# Patient Record
Sex: MALE | Race: WHITE | NOT HISPANIC OR LATINO | Employment: OTHER | URBAN - METROPOLITAN AREA
[De-identification: names, ages, dates, MRNs, and addresses within clinical notes are randomized per-mention and may not be internally consistent; named-entity substitution may affect disease eponyms.]

---

## 2021-01-15 DIAGNOSIS — Z23 NEED FOR VACCINATION: ICD-10-CM

## 2021-02-17 ENCOUNTER — IMMUNIZATION (OUTPATIENT)
Dept: FAMILY PLANNING/WOMEN'S HEALTH CLINIC | Facility: IMMUNIZATION CENTER | Age: 71
End: 2021-02-17
Attending: INTERNAL MEDICINE
Payer: MEDICARE

## 2021-02-17 DIAGNOSIS — Z23 NEED FOR VACCINATION: ICD-10-CM

## 2021-02-17 DIAGNOSIS — Z23 ENCOUNTER FOR VACCINATION: Primary | ICD-10-CM

## 2021-02-17 PROCEDURE — 91300 PFIZER SARS-COV-2 VACCINE: CPT

## 2021-02-17 PROCEDURE — 0001A PFIZER SARS-COV-2 VACCINE: CPT

## 2021-03-06 ENCOUNTER — IMMUNIZATION (OUTPATIENT)
Dept: FAMILY PLANNING/WOMEN'S HEALTH CLINIC | Facility: IMMUNIZATION CENTER | Age: 71
End: 2021-03-06
Attending: INTERNAL MEDICINE
Payer: MEDICARE

## 2021-03-06 DIAGNOSIS — Z23 ENCOUNTER FOR VACCINATION: Primary | ICD-10-CM

## 2021-03-06 PROCEDURE — 91300 PFIZER SARS-COV-2 VACCINE: CPT

## 2021-03-06 PROCEDURE — 0002A PFIZER SARS-COV-2 VACCINE: CPT

## 2021-11-15 PROBLEM — M25.521 RIGHT ELBOW PAIN: Status: ACTIVE | Noted: 2021-11-15

## 2021-11-15 PROBLEM — R22.33: Status: ACTIVE | Noted: 2021-11-15

## 2022-06-15 ENCOUNTER — HOSPITAL ENCOUNTER (EMERGENCY)
Facility: MEDICAL CENTER | Age: 72
End: 2022-06-15
Attending: EMERGENCY MEDICINE
Payer: MEDICARE

## 2022-06-15 VITALS
BODY MASS INDEX: 34.54 KG/M2 | HEART RATE: 69 BPM | SYSTOLIC BLOOD PRESSURE: 142 MMHG | RESPIRATION RATE: 18 BRPM | TEMPERATURE: 96.9 F | WEIGHT: 246.69 LBS | DIASTOLIC BLOOD PRESSURE: 76 MMHG | OXYGEN SATURATION: 99 % | HEIGHT: 71 IN

## 2022-06-15 DIAGNOSIS — R19.7 DIARRHEA, UNSPECIFIED TYPE: ICD-10-CM

## 2022-06-15 DIAGNOSIS — S20.211A CHEST WALL CONTUSION, RIGHT, INITIAL ENCOUNTER: ICD-10-CM
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
## 2022-06-15 PROCEDURE — 99282 EMERGENCY DEPT VISIT SF MDM: CPT

## 2022-06-15 RX ORDER — TRAMADOL HYDROCHLORIDE 50 MG/1
50 TABLET ORAL EVERY 6 HOURS PRN
Qty: 20 TABLET | Refills: 0 | Status: SHIPPED | OUTPATIENT
Start: 2022-06-15 | End: 2022-06-22

## 2022-06-15 RX ORDER — LISINOPRIL 5 MG/1
5 TABLET ORAL DAILY
COMMUNITY

## 2022-06-15 NOTE — ED TRIAGE NOTES
"Chief Complaint   Patient presents with   • GLF     Pt reports GLF last night while trying to put traffic cones away at the [employer]. Pt reports he stepped wrong and fell forward onto right side. Pt reports right side pain over right rib cage. No trauma or bruising noted over the area. Pt denies LOC, denies blood thinners, denies head strike.   • Diarrhea     Starting tonight after fall.        71 yo male to triage for above complaint. Pt able to ambulate to triage room and back to lobby with steady gait. GCS=15.     Pt is alert and oriented, speaking in full sentences, follows commands and responds appropriately to questions. NAD. Resp are even and unlabored.      Pt placed in lobby. Pt educated on triage process. Pt encouraged to alert staff for any changes.     Patient and staff wearing appropriate PPE    BP (!) 184/103 Comment: HX  Pulse 91   Temp 36 °C (96.8 °F) (Temporal)   Resp 18   Ht 1.803 m (5' 11\")   Wt 112 kg (246 lb 11.1 oz)   SpO2 97% Comment: Room air  BMI 34.41 kg/m²     "

## 2022-06-15 NOTE — DISCHARGE INSTRUCTIONS
Based on examination this is likely chest contusion and not fracture but both are treated the same.  Use Ultram as needed with Tylenol for pain control and do not wrap the chest as this may lead to pneumonia  Do not operate a vehicle or heavy machinery on Ultram  If you have continued diarrhea more than 4 to 5 days please follow-up with your primary care provider

## 2022-06-15 NOTE — ED PROVIDER NOTES
ED Provider Note    Scribed for Mynor Goncalves M.D. by Sharon Watson. 6/15/2022  7:23 AM    Primary care provider: Pcp Pt States None  Means of arrival: Walk-in  History obtained from: Patient  History limited by: None    CHIEF COMPLAINT  Chief Complaint   Patient presents with   • GLF     Pt reports GLF last night while trying to put traffic cones away at the [employer] [place]. Pt reports he stepped wrong and fell forward onto right side. Pt reports right side pain over right rib cage. No trauma or bruising noted over the area. Pt denies LOC, denies blood thinners, denies head strike.   • Diarrhea     Starting tonight after fall.        HPI  Danny Welch is a 72 y.o. male who presents to the Emergency Department for right-sided rib pain acute onset last night at 7:00 PM after sustaining a ground level fall. He states he was moving traffic cones at the [employer] when he stepped on the base and tripped. He denies loss of consciousness. He rates his rib pain an 8-9/10 in severity. The patient denies taking any medications to alleviate pain.   He additionally reports one episode of diarrhea while sleepign in his bed last night. He denies back pain, loss of sensation in the groin or genitals, and arm pain.     REVIEW OF SYSTEMS  Pertinent negatives include no back pain. As above, all other systems reviewed and are negative.   See HPI for further details.     PAST MEDICAL HISTORY   has a past medical history of Hypertension.    SURGICAL HISTORY  patient denies any surgical history    SOCIAL HISTORY  Social History     Tobacco Use   • Smoking status: Never Smoker   • Smokeless tobacco: Never Used   Substance Use Topics   • Alcohol use: Yes     Comment: occ   • Drug use: Never      Social History     Substance and Sexual Activity   Drug Use Never       FAMILY HISTORY  History reviewed. No pertinent family history.    CURRENT MEDICATIONS  Home Medications     Reviewed by Bindu Dixon R.N. (Registered Nurse) on 06/15/22 at  "0500  Med List Status: Partial   Medication Last Dose Status   lisinopril (PRINIVIL) 5 MG Tab  Active                ALLERGIES  No Known Allergies    PHYSICAL EXAM  VITAL SIGNS: /89   Pulse 69   Temp 36.1 °C (96.9 °F) (Temporal)   Resp 18   Ht 1.803 m (5' 11\")   Wt 112 kg (246 lb 11.1 oz)   SpO2 99% Comment: Roomair  BMI 34.41 kg/m²   Constitutional: Well developed, Well nourished, No acute distress, Non-toxic appearance.   HENT: Normocephalic, Atraumatic, Bilateral external ears normal, Oropharynx is clear mucous membranes are moist. No oral exudates or nasal discharge.   Eyes: Pupils are equal round and reactive, EOMI, Conjunctiva normal, No discharge.   Neck: Normal range of motion, No tenderness, Supple, No stridor. No meningismus.  Lymphatic: No lymphadenopathy noted.   Cardiovascular: Regular rate and rhythm without murmur rub or gallop.  Thorax & Lungs: Clear breath sounds bilaterally without wheezes, rhonchi or rales. There is no chest wall tenderness.   Abdomen: Soft non-tender non-distended. There is no rebound or guarding. No organomegaly is appreciated. Bowel sounds are hyperactive.  Skin: Normal without rash.   Back: No CVA or spinal tenderness. No pain or tenderness.  Extremities: Intact distal pulses, No edema, No tenderness, No cyanosis, No clubbing. Capillary refill is less than 2 seconds.  Musculoskeletal: Good range of motion in all major joints. No tenderness to palpation or major deformities noted.   Neurologic: Alert & oriented x 3, Normal motor function, Normal sensory function, No focal deficits noted. Reflexes are normal.  Psychiatric: Affect normal, Judgment normal, Mood normal. There is no suicidal ideation or patient reported hallucinations.     COURSE & MEDICAL DECISION MAKING  Nursing notes, VS, PMSFHx reviewed in chart.    7:23 AM - Patient seen and examined at bedside. I informed the patient he likely has a rib contusion. I informed him about the plan for discharge at " this time with a prescription for Tramadol.  Patient verbalizes understanding and agreement to this plan of care.  Patient understands that doing a chest x-ray will not definitively rule out fracture and if fracture is present it is likely going to be hairline and nondisplaced based on examination and therefore he is agreeable to not doing a chest x-ray and just treating clinically.    In prescribing controlled substances to this patient, I certify that I have obtained and reviewed the medical history of Danny Welch. I have also made a good grisel effort to obtain applicable records from other providers who have treated the patient and records did not demonstrate any increased risk of substance abuse that would prevent me from prescribing controlled substances.   I have conducted a physical exam and documented it. I have reviewed Mr. Welch’s prescription history as maintained by the [place] Prescription Monitoring Program.     I have assessed the patient’s risk for abuse, dependency, and addiction using the validated Opioid Risk Tool available at https://www.mdcalc.com/neodwo-zlkb-suoe-ort-narcotic-abuse.     Given the above, I believe the benefits of controlled substance therapy outweigh the risks. The reasons for prescribing controlled substances include non-narcotic, oral analgesic alternatives have been inadequate for pain control. Accordingly, I have discussed the risk and benefits, treatment plan, and alternative therapies with the patient.       I have signed into and reviewed the patient's prescription monitoring program data prior to prescribing a scheduled drug. The patient will not drink alcohol nor drive with prescribed medications. The patient will return for new or worsening symptoms and is stable at the time of discharge.    DISPOSITION:  Patient will be discharged home in stable condition.  Patient understands to use Ultram with Tylenol as needed for pain control, to avoid wrapping his rib cage  and to not operate a vehicle or heavy machinery while on Ultram.  If he has diarrhea more than 4 to 5 days he will follow-up with his doctor    FINAL IMPRESSION  1. Fall, initial encounter    2. Chest wall contusion, right, initial encounter    3. Diarrhea, unspecified type          Sharon WHATLEY (Scribe), am scribing for, and in the presence of, Mynor Goncalves M.D..    Electronically signed by: Sharon Watson (Scribe), 6/15/2022    IMynor M.D. personally performed the services described in this documentation, as scribed by Sharon Watson in my presence, and it is both accurate and complete.    The note accurately reflects work and decisions made by me.  Mynor Goncalves M.D.  6/15/2022  7:48 AM

## 2022-11-02 ENCOUNTER — PATIENT MESSAGE (OUTPATIENT)
Dept: HEALTH INFORMATION MANAGEMENT | Facility: OTHER | Age: 72
End: 2022-11-02